# Patient Record
Sex: MALE | Race: WHITE
[De-identification: names, ages, dates, MRNs, and addresses within clinical notes are randomized per-mention and may not be internally consistent; named-entity substitution may affect disease eponyms.]

---

## 2018-03-06 ENCOUNTER — HOSPITAL ENCOUNTER (INPATIENT)
Dept: HOSPITAL 92 - ERS | Age: 68
LOS: 2 days | Discharge: HOME | DRG: 282 | End: 2018-03-08
Attending: INTERNAL MEDICINE | Admitting: INTERNAL MEDICINE
Payer: MEDICARE

## 2018-03-06 VITALS — BODY MASS INDEX: 28.5 KG/M2

## 2018-03-06 DIAGNOSIS — I25.10: ICD-10-CM

## 2018-03-06 DIAGNOSIS — J44.9: ICD-10-CM

## 2018-03-06 DIAGNOSIS — Z95.5: ICD-10-CM

## 2018-03-06 DIAGNOSIS — I22.9: ICD-10-CM

## 2018-03-06 DIAGNOSIS — R07.89: Primary | ICD-10-CM

## 2018-03-06 DIAGNOSIS — I10: ICD-10-CM

## 2018-03-06 DIAGNOSIS — E78.5: ICD-10-CM

## 2018-03-06 DIAGNOSIS — Z87.891: ICD-10-CM

## 2018-03-06 DIAGNOSIS — I48.91: ICD-10-CM

## 2018-03-06 LAB
ALBUMIN SERPL BCG-MCNC: 3.8 G/DL (ref 3.4–4.8)
ALP SERPL-CCNC: 89 U/L (ref 40–150)
ALT SERPL W P-5'-P-CCNC: 25 U/L (ref 8–55)
ANION GAP SERPL CALC-SCNC: 12 MMOL/L (ref 10–20)
APTT PPP: 34.6 SEC (ref 22.9–36.1)
AST SERPL-CCNC: 22 U/L (ref 5–34)
BASOPHILS # BLD AUTO: 0 THOU/UL (ref 0–0.2)
BASOPHILS NFR BLD AUTO: 0.3 % (ref 0–1)
BILIRUB SERPL-MCNC: 0.4 MG/DL (ref 0.2–1.2)
BUN SERPL-MCNC: 9 MG/DL (ref 8.4–25.7)
CALCIUM SERPL-MCNC: 8.7 MG/DL (ref 7.8–10.44)
CHLORIDE SERPL-SCNC: 106 MMOL/L (ref 98–107)
CK MB SERPL-MCNC: 1.8 NG/ML (ref 0–6.6)
CK SERPL-CCNC: 71 U/L (ref 30–200)
CO2 SERPL-SCNC: 26 MMOL/L (ref 23–31)
COMM CRITICAL RESULTS DOC: (no result)
CREAT CL PREDICTED SERPL C-G-VRATE: 0 ML/MIN (ref 70–130)
EOSINOPHIL # BLD AUTO: 0.5 THOU/UL (ref 0–0.7)
EOSINOPHIL NFR BLD AUTO: 3.3 % (ref 0–10)
GLOBULIN SER CALC-MCNC: 2.7 G/DL (ref 2.4–3.5)
GLUCOSE SERPL-MCNC: 135 MG/DL (ref 80–115)
HGB BLD-MCNC: 13.8 G/DL (ref 14–18)
INR PPP: 1.2
LIPASE SERPL-CCNC: 45 U/L (ref 8–78)
LYMPHOCYTES # BLD: 1.9 THOU/UL (ref 1.2–3.4)
LYMPHOCYTES NFR BLD AUTO: 14.1 % (ref 21–51)
MCH RBC QN AUTO: 32 PG (ref 27–31)
MCV RBC AUTO: 93.8 FL (ref 80–94)
MONOCYTES # BLD AUTO: 1.1 THOU/UL (ref 0.11–0.59)
MONOCYTES NFR BLD AUTO: 7.8 % (ref 0–10)
NEUTROPHILS # BLD AUTO: 10.1 THOU/UL (ref 1.4–6.5)
NEUTROPHILS NFR BLD AUTO: 74.4 % (ref 42–75)
PLATELET # BLD AUTO: 265 THOU/UL (ref 130–400)
POTASSIUM SERPL-SCNC: 3.9 MMOL/L (ref 3.5–5.1)
PROTHROMBIN TIME: 15.6 SEC (ref 12–14.7)
RBC # BLD AUTO: 4.32 MILL/UL (ref 4.7–6.1)
SODIUM SERPL-SCNC: 140 MMOL/L (ref 136–145)
TROPONIN I SERPL DL<=0.01 NG/ML-MCNC: 3.63 NG/ML (ref ?–0.03)
TROPONIN I SERPL DL<=0.01 NG/ML-MCNC: 4.77 NG/ML (ref ?–0.03)
TROPONIN I SERPL DL<=0.01 NG/ML-MCNC: 6.14 NG/ML (ref ?–0.03)
WBC # BLD AUTO: 13.5 THOU/UL (ref 4.8–10.8)

## 2018-03-06 PROCEDURE — 96375 TX/PRO/DX INJ NEW DRUG ADDON: CPT

## 2018-03-06 PROCEDURE — 96366 THER/PROPH/DIAG IV INF ADDON: CPT

## 2018-03-06 PROCEDURE — 94760 N-INVAS EAR/PLS OXIMETRY 1: CPT

## 2018-03-06 PROCEDURE — 85730 THROMBOPLASTIN TIME PARTIAL: CPT

## 2018-03-06 PROCEDURE — 71275 CT ANGIOGRAPHY CHEST: CPT

## 2018-03-06 PROCEDURE — 85610 PROTHROMBIN TIME: CPT

## 2018-03-06 PROCEDURE — 84484 ASSAY OF TROPONIN QUANT: CPT

## 2018-03-06 PROCEDURE — A4216 STERILE WATER/SALINE, 10 ML: HCPCS

## 2018-03-06 PROCEDURE — 83880 ASSAY OF NATRIURETIC PEPTIDE: CPT

## 2018-03-06 PROCEDURE — 71045 X-RAY EXAM CHEST 1 VIEW: CPT

## 2018-03-06 PROCEDURE — 83690 ASSAY OF LIPASE: CPT

## 2018-03-06 PROCEDURE — 96367 TX/PROPH/DG ADDL SEQ IV INF: CPT

## 2018-03-06 PROCEDURE — 94640 AIRWAY INHALATION TREATMENT: CPT

## 2018-03-06 PROCEDURE — 93306 TTE W/DOPPLER COMPLETE: CPT

## 2018-03-06 PROCEDURE — 36415 COLL VENOUS BLD VENIPUNCTURE: CPT

## 2018-03-06 PROCEDURE — 80048 BASIC METABOLIC PNL TOTAL CA: CPT

## 2018-03-06 PROCEDURE — 83605 ASSAY OF LACTIC ACID: CPT

## 2018-03-06 PROCEDURE — 87040 BLOOD CULTURE FOR BACTERIA: CPT

## 2018-03-06 PROCEDURE — 85025 COMPLETE CBC W/AUTO DIFF WBC: CPT

## 2018-03-06 PROCEDURE — 93005 ELECTROCARDIOGRAM TRACING: CPT

## 2018-03-06 PROCEDURE — 96365 THER/PROPH/DIAG IV INF INIT: CPT

## 2018-03-06 PROCEDURE — 80053 COMPREHEN METABOLIC PANEL: CPT

## 2018-03-06 PROCEDURE — 82553 CREATINE MB FRACTION: CPT

## 2018-03-06 PROCEDURE — 93798 PHYS/QHP OP CAR RHAB W/ECG: CPT

## 2018-03-06 RX ADMIN — Medication SCH ML: at 09:00

## 2018-03-06 RX ADMIN — ASPIRIN SCH MG: 81 TABLET ORAL at 08:59

## 2018-03-06 RX ADMIN — Medication SCH ML: at 21:29

## 2018-03-06 NOTE — HP
PRIMARY CARE PROVIDER:  None.

 

CHIEF COMPLAINT:  Chest pain.

 

HISTORY OF PRESENT ILLNESS:  Mr. Strange is a pleasant 67-year-old gentleman who was seen at [facility] on 2018.

 

One week ago, he had what appears to be an ST elevation myocardial infarction.  He underwent emergent
 PCI at [facility] in [place], [place].  He had Xience Alpine 3 x 18 mm stent placed in the
 distal RCA and proximal LAD.  He was discharged home after 3 days.

 

One of his favorite aunt  and he therefore came to this area to attend her .  He attended 
the wake today.  He reports that he developed retrosternal chest discomfort while he was sitting, it 
was a sensation of heaviness, 8-9/10, nonradiating, accompanied by shortness of breath, nausea, and l
ightheadedness.  He denies any fevers or chills.  By the time I saw him, the pain had resolved.

 

REVIEW OF SYSTEMS:  The following complete review of systems was negative, unless otherwise mentioned
 in the HPI or below:  Constitutional:  Weight loss or gain, ability to conduct usual activities.  Sk
in:  Rash, itching.  Eyes:  Double vision, pain.  ENT/Mouth:  Nose bleeding, neck stiffness, pain, te
nderness.  Cardiovascular:  Palpitations, dyspnea on exertion, orthopnea.  Respiratory:  Shortness of
 breath, wheezing, cough, hemoptysis, fever or night sweats.  Gastrointestinal:  Poor appetite, abdom
inal pain, heartburn, nausea, vomiting, constipation, or diarrhea.  Genitourinary:  Urgency, frequenc
y, dysuria, nocturia.  Musculoskeletal:  Pain, swelling.  Neurologic/Psychiatric:  Anxiety, depressio
n.  Allergy/Immunologic:  Skin rash, bleeding tendency.

 

PAST MEDICAL HISTORY:  Significant for coronary artery disease, myocardial infarction, atrial fibrill
ation, chronic obstructive pulmonary disease, dyslipidemia, and hypertension.

 

PAST SURGICAL HISTORY:  Appendectomy and PCI with stents.

 

FAMILY HISTORY:  Three siblings with cardiac disease.  His dad  from emphysema.

 

SOCIAL HISTORY:  The patient is a former smoker.  He denies alcohol use or recreational drug use.

 

ALLERGIES:  No known drug allergies.

 

CURRENT MEDICATIONS:  Include Eliquis 2.5 mg 2 times a day, losartan 25 mg daily, atorvastatin 80 mg 
daily, pantoprazole 40 mg daily, carvedilol 12.5 mg 2 times a day, Plavix 75 mg daily, and aspirin 81
 mg daily.

 

CODE STATUS:  I discussed his code status.  He is FULL CODE.

 

PHYSICAL EXAMINATION:

GENERAL:  Mr. Strange is awake and alert, not in acute distress.

VITAL SIGNS:  Blood pressure is 127/83, pulse is 103, his breathing at rate of 20 and saturating 94% 
on 2 liters of oxygen.  He is afebrile.

EYES:  No scleral icterus.  No pitting palpable.

ENT:  Moist mucosal membranes.  No oropharyngeal, erythema, or exudates.

NECK:  Supple, nontender, normal range of movement.  Trachea is midline.

RESPIRATORY:  Accessory muscles of breathing are not active.  Chest wall movements are symmetric bila
terally.  He has occasional expiratory wheeze.

CARDIOVASCULAR:  S1 and S2 are heard, irregular and tachycardic.  Peripheral pulses palpable.  No car
otid bruit, no pericardial rub.

ABDOMEN:  Soft, nontender, bowel sounds are heard.  No hepatomegaly, no splenomegaly.

NEUROLOGIC:  Cranial nerves II through XII intact.  Deep tendon reflexes are 2+.

SKIN:  No rashes or subcutaneous nodules.

MUSCULOSKELETAL:  Power is 5/5 in all 4 extremities.

LYMPHATIC:  No cervical lymphadenopathy.

PSYCHIATRIC:  Normal mood, normal affect.  Patient is oriented to person, place, and time.

 

LABORATORY DATA:  Mr. Strange' labs and investigations were reviewed.  I reviewed his electrocardiogr
am, which shows atrial fibrillation with rapid ventricular response, no ST changes to suggest an acut
e coronary syndrome.  I also reviewed his chest x-ray, which does not show any pulmonary infiltrates.
  He also had a CT angiogram of the chest.  The report is pending.  He has leukocytosis with 13,500 w
saleem cells, of which 74% are neutrophils, normocytic anemia with hemoglobin 13.8, normal platelet cou
nt, INR 1.2, unremarkable comprehensive metabolic profile, elevated BNP of 1163 and elevated troponin
 I of 6.142.

 

ASSESSMENT AND PLAN:  Mr. Strange is a pleasant 67-year-old gentleman who was seen at [facility] on 2018.  His problem list includes:

1.  Chest pain:  Etiology unclear at this time, but he has significant risk factors for cardiac cause
s, including recent MI and PCI with stents.  He will be admitted to the hospital for telemetry monito
ring and Cardiology Service will be consulted.

2.  Atrial fibrillation with rapid ventricular response:  We will start patient on a calcium channel 
blocker drip for rate control.

3.  Elevated troponin:  It is unclear whether his troponin is trending down from his recent myocardia
l infarction or if it is trending up.  We will recheck troponin level.  We will also request records 
from [facility] in [place], [place].

4.  Hypertension:  Monitor vital signs, titrate antihypertensives as needed.

5.  Dyslipidemia:  Continue statin.

6.  Continue proton-pump inhibitor, since patient is on aspirin, Plavix, and Eliquis at this point in
 time.

 

LEVEL OF RISK:  High.

 

LEVEL OF COMPLEXITY:  High.

## 2018-03-06 NOTE — CON
DATE OF CONSULTATION:  2018

 

HISTORY OF PRESENT ILLNESS:  Patient is a 67-year-old gentleman who presented 
with acute onset of recurrent chest discomfort.  The patient has no previous 
cardiac history until a week ago he presented with acute onset of myocardial 
infarction.  The patient underwent emergent cardiac catheterization.  He 
apparently underwent PTCA and stent placement into the right coronary artery 
and left anterior descending artery.  The patient also was diagnosed with 
atrial fibrillation.  The patient was discharged on aspirin, Plavix, and 
Eliquis.  The patient also was also placed on Coreg and losartan.  The patient 
has been compliant with his medications.  He was on his way to a .  He 
had travelled for a long distance and then developed mid sternal chest 
discomfort.  He did not have nitroglycerin and the patient subsequently 
received sublingual nitro with resolution of his chest pain.  The patient 
denies having any present chest discomfort.

 

PAST MEDICAL HISTORY:  

1.  Coronary artery disease.

2.  Atrial fibrillation.

3.  Chronic obstructive pulmonary disease.

4.  Rheumatoid arthritis.

 

PAST SURGICAL HISTORY:  Appendectomy.

 

SOCIAL HISTORY:  He is a former smoker.

 

ALLERGIES:  He has no known drug allergies.

 

FAMILY HISTORY:  There is no strong family history of heart disease.

 

REVIEW OF SYSTEMS:  Ten-point system is unremarkable.  No history of easy 
bruising, bleeding, bright red blood per rectum.

 

PHYSICAL EXAMINATION:

GENERAL:  This is an elderly gentleman in no acute distress.

VITAL SIGNS:  Blood pressure 134/68, heart rate is 107 and irregular.

NECK:  Showed no jugular venous distention.

LUNGS:  Have bilateral wheezes.

HEART:  Regular rate and rhythm, normal S1 and S2, 1/6 systolic murmur.

ABDOMEN:  Nondistended.

EXTREMITIES:  Showed no edema.

SKIN:  Warm and dry.

NEUROLOGIC:  Nonfocal.

VASCULAR:  Radial pulses are 2+.

 

LABORATORY DATA AND IMAGING DATA:  Sodium was 140, potassium 3.9, chloride 106, 
bicarbonate 26, BUN 9, creatinine 0.94.  Troponin was 3.62.  His BNP is 1163.  
His EKG revealed him to have atrial fibrillation with Q-waves suggestive of 
previous infarct and T-wave abnormality suggestive of ischemia.

 

IMPRESSION:

1.  Chest pain.

2.  History of recent myocardial infarction.

3.  New onset of atrial fibrillation.

4.  Hypertension.

5.  Chronic obstructive pulmonary disease.

 

DISCUSSION AND PLAN:  This gentleman presents with recurrent chest pain.  His 
CPK MB is not elevated, and his troponin is declining suggestive of an old MI.  
The patient does not appear  to have  congestive heart failure.  From a cardiac 
standpoint, I discussed the option of undergoing repeat catheterization versus 
medical therapy.  The patient at this time would prefer to be placed on medical 
therapy.  We will obtain an echocardiogram to reevaluate his left ventricular 
function.  We will treat the patient with Lovenox.  We will follow this patient 
with you through his hospitalization.

 

FLORA

## 2018-03-06 NOTE — RAD
PORTABLE CHEST:

 

History: Chest pain. 

 

Comparison: None. 

 

FINDINGS/IMPRESSION:  

Streaky atelectasis and/or infiltrate in the lung bases is seen. This is better appreciated on CT carmencita
st, also performed 3-6-18. 

 

There are chronic lung changes with bullous change which are also better appreciated on CT. 

 

 

 

POS: [facility]

## 2018-03-06 NOTE — CT
PRELIMINARY REPORT/[facility]/EMERGENCY AFTER

HOURS PROCEDURE:

 

EXAM:

CT Angiography Chest With Intravenous Contrast

 

CLINICAL HISTORY:

67 years old, male; Chest pain; On breathing; substernal chest pain onset 1800 tonight, pain resolved
 now. Denies radiation of pain. Pain felt dull and tight. States that he also felt dizzy and SOB when
 he was experiencing chest pain earlier this evening. Reports nausea, denies vomiting. Pt states he i
s here from out [facility] for aunt's . Reports feeling upset and anxious due to this recently. O
n tuesday last week, pt was admitted for mi at [facility] and had x2 stents placed, one on 
r which was 100% blocked and one on l side which was 90% blocked. Family reports on back side of pt's
 heart pt had 50% blockage which doctor thought did not need a stent at that time. Pt was d/c with as
pirin 81 mg and plavix. States that he has been taking this medication as prescribed and has not miss
ed any doses. Pt was h/o smoking for 40 years, 2 packs/day, and quit about 6 years ago. Pt still chew
s tobacco. Wife states pt also takes eliquis. Denies h/o dm. Reports h/o high cholesterol and HTN.

 

TECHNIQUE:

Axial computed tomographic angiography images of the chest with intravenous contrast using pulmonary 
embolism protocol. Coronal and bilateral oblique CTA MIP reformats.

 

CONTRAST:

100 mL of isovue 370 administered intravenously.

 

COMPARISON:

None.

 

FINDINGS:

Pulmonary arteries: No pulmonary embolism.

Aorta:

No evidence of thoracic aortic dissection/aneurysm.

Mild aortic arch calcifications.

Lungs:

Lower lungs show moderate, subsegmental atelectasis and/or scar.

Marked centrilobular emphysema of both upper lobes.

Pleural space:

Small bilateral pleural effusions.

No pneumothorax.

Heart: Mild cardiomegaly.

No significant pericardial effusion. No evidence of RV dysfunction.

Bones/joints: Mild, thoracic spine degenerative disease.

Soft tissues: Moderate, bilateral, breast retroareolar, soft tissue most likely represents gynecomast
ia.

Lymph nodes: Mildly enlarged right pulmonary hilar lymph node measuring 2.7 x 2.0 cm (axial series 2,
 image 57).

Stomach and bowel:

Stomach difficult to accurately comment upon due to its minimal distention.

There may be true gastric rugal fold thickening.

This may represent inflammatory or infectious gastritis.

 

IMPRESSION:

1. No CT evidence of pulmonary embolism.

2. Lower lungs show moderate, subsegmental atelectasis and/or scar.

3. Marked centrilobular emphysema

4. Small bilateral pleural effusions.

5. Mild cardiomegaly.

6. Moderate, bilateral, breast retroareolar, soft tissue most likely represents gynecomastia. Physica
l exam and if clinically indicated nonemergent breast imaging (US and diagnostic mammography) may

be performed.

7. Mildly enlarged right pulmonary hilar lymph node.

Causes include inflammation, infection, or neoplasm.

8. Stomach difficult to accurately comment upon due to its minimal distention. However, there may be 
true diffuse gastric rugal fold thickening.

This may represent inflammatory or infectious gastritis.

 

If clinically indicated, nonemergent upper GI fluoroscopic series may provide more information.

 

Thank you for allowing us to participate in the care of your patient.

 

Dictated and Authenticated by: Marlo Booth MD

2018 3:14 AM Central Time ([place] & [place])

 

 

 

 

FINAL REPORT

CT PULMONARY ANGIOGRAM WITH IV CONTRAST AND 3D POST PROCESSING:

 

I agree with the preliminary report given by Dr. Marlo Booth of [facility]. 

 

POS: [facility]

## 2018-03-07 LAB
ANION GAP SERPL CALC-SCNC: 11 MMOL/L (ref 10–20)
BUN SERPL-MCNC: 12 MG/DL (ref 8.4–25.7)
CALCIUM SERPL-MCNC: 8.8 MG/DL (ref 7.8–10.44)
CHLORIDE SERPL-SCNC: 106 MMOL/L (ref 98–107)
CO2 SERPL-SCNC: 25 MMOL/L (ref 23–31)
CREAT CL PREDICTED SERPL C-G-VRATE: 103 ML/MIN (ref 70–130)
GLUCOSE SERPL-MCNC: 140 MG/DL (ref 80–115)
HGB BLD-MCNC: 12.8 G/DL (ref 14–18)
MCH RBC QN AUTO: 31.2 PG (ref 27–31)
MCV RBC AUTO: 94.5 FL (ref 80–94)
MDIFF COMPLETE?: YES
PLATELET # BLD AUTO: 284 THOU/UL (ref 130–400)
POTASSIUM SERPL-SCNC: 4.1 MMOL/L (ref 3.5–5.1)
RBC # BLD AUTO: 4.1 MILL/UL (ref 4.7–6.1)
SODIUM SERPL-SCNC: 138 MMOL/L (ref 136–145)
WBC # BLD AUTO: 22.5 THOU/UL (ref 4.8–10.8)

## 2018-03-07 RX ADMIN — Medication SCH ML: at 08:04

## 2018-03-07 RX ADMIN — ASPIRIN SCH MG: 81 TABLET ORAL at 08:03

## 2018-03-07 NOTE — PDOC.PN
- Subjective


Encounter Start Date: 03/07/18


Encounter Start Time: 19:45


Subjective: f/u for CP and recent acute MI s/p PTCA and stent placement. ? ACS


-: recurrence but medically managed currently. A-fib RVR initially now


-: rate controlled on Eliquis for anticoagulation. No new complaints.





- Objective


Resuscitation Status: 


 











Resuscitation Status           FULL:Full Resuscitation














MAR Reviewed: Yes


Vital Signs & Weight: 


 Vital Signs (12 hours)











  Temp Pulse Pulse Pulse Resp BP BP


 


 03/07/18 15:25  97.6 F  86    16  


 


 03/07/18 11:28  97.7 F  65    18  


 


 03/07/18 10:15    70  80   99/68  128/64














  BP Pulse Ox Pulse Ox Pulse Ox


 


 03/07/18 15:25  141/75 H  92 L  


 


 03/07/18 11:28  127/77  94 L  


 


 03/07/18 10:15    94 L  90 L








 Weight











Weight                         176 lb 8 oz














I&O: 


 











 03/06/18 03/07/18 03/08/18





 06:59 06:59 06:59


 


Intake Total  790 1200


 


Output Total  740 740


 


Balance  50 460











Result Diagrams: 


 03/07/18 05:14





 03/07/18 05:14


Additional Labs: 





Microbiology





03/06/18 01:37   Venous blood - Left Arm   Blood Culture - Preliminary


                              Specimen has been received and culture in 

progress.


                              No Growth to date.


03/06/18 01:33   Venous blood - Left Hand   Blood Culture - Preliminary


                              Specimen has been received and culture in 

progress.


                              No Growth to date.





 Laboratory Tests











  03/06/18 03/06/18 03/06/18





  01:05 01:05 01:05


 


WBC  13.5 H  


 


Hgb  13.8 L  


 


Troponin I   6.142 H* 


 


B-Natriuretic Peptide    1163.6 H














  03/06/18 03/06/18





  04:20 07:08


 


WBC  


 


Hgb  


 


Troponin I  4.773 H*  3.627 H*


 


B-Natriuretic Peptide  











Radiology Reviewed by me: Yes (CT chest - mod-severe emphysematous changes, no 

PE)


EKG Reviewed by me: Yes (Tele - A-fib in 80's)





Phys Exam





- Physical Examination


Constitutional: NAD


HEENT: PERRLA, oral pharynx no lesions


Neck: no JVD, supple


diminished breath sounds bilat, occ wheeze


Cardiovascular: irregular


Gastrointestinal: soft, non-tender, no distention, positive bowel sounds


Musculoskeletal: no edema, pulses present


Neurological: normal sensation, moves all 4 limbs


Psychiatric: A&O x 3


Skin: normal turgor, cap refill <2 seconds





Dx/Plan


(1) Atrial fibrillation with RVR


Code(s): I48.91 - UNSPECIFIED ATRIAL FIBRILLATION   Status: Acute   Comment: 

rate-controlled currently, continue Coreg, Digoxin and Eliquis   





(2) Elevated troponin I level


Code(s): R74.8 - ABNORMAL LEVELS OF OTHER SERUM ENZYMES   Status: Acute   

Comment: ? new event or a result of recent MI with stent placement, pt 

requesing medical mgmt and not [facility]   





(3) Myocardial infarction


Code(s): I21.9 - ACUTE MYOCARDIAL INFARCTION, UNSPECIFIED   Status: Acute   

Comment: s/p stent placement after PTCA, continue ASA and Plavix, Lipitor, 

Coreg   





(4) Ischemic cardiomyopathy


Code(s): I25.5 - ISCHEMIC CARDIOMYOPATHY   Status: Acute   Comment: EF 35-40%, 

serial monitoring post revascularization, ACE-i on d/c   





(5) Tobacco abuse


Code(s): Z72.0 - TOBACCO USE   Status: Acute   





(6) Leukocytosis


Code(s): D72.829 - ELEVATED WHITE BLOOD CELL COUNT, UNSPECIFIED   Status: Acute

   Comment: ? etiology, ? demargination, no focal infection noted and no fever, 

repeat CBC in am   





- Plan


plan discussed w/ family, out of bed/ambulate, DVT proph w/SCDs


Stable currently


-: Continue ASA and Plavix


-: Continue Lipitor


-: continue Digoxin and Coreg


-: Continue Eliquis 2.5mg BID





* Tobacco cessation


* Likely home in am 3/8/18

## 2018-03-08 VITALS — SYSTOLIC BLOOD PRESSURE: 161 MMHG | DIASTOLIC BLOOD PRESSURE: 101 MMHG

## 2018-03-08 VITALS — TEMPERATURE: 98 F

## 2018-03-08 LAB
BASOPHILS # BLD AUTO: 0 THOU/UL (ref 0–0.2)
BASOPHILS NFR BLD AUTO: 0.1 % (ref 0–1)
EOSINOPHIL # BLD AUTO: 0.1 THOU/UL (ref 0–0.7)
EOSINOPHIL NFR BLD AUTO: 0.9 % (ref 0–10)
HGB BLD-MCNC: 13 G/DL (ref 14–18)
LYMPHOCYTES # BLD: 1.9 THOU/UL (ref 1.2–3.4)
LYMPHOCYTES NFR BLD AUTO: 12.1 % (ref 21–51)
MCH RBC QN AUTO: 31.2 PG (ref 27–31)
MCV RBC AUTO: 94.8 FL (ref 80–94)
MONOCYTES # BLD AUTO: 1.4 THOU/UL (ref 0.11–0.59)
MONOCYTES NFR BLD AUTO: 8.7 % (ref 0–10)
NEUTROPHILS # BLD AUTO: 12.5 THOU/UL (ref 1.4–6.5)
NEUTROPHILS NFR BLD AUTO: 78.2 % (ref 42–75)
PLATELET # BLD AUTO: 292 THOU/UL (ref 130–400)
RBC # BLD AUTO: 4.17 MILL/UL (ref 4.7–6.1)
WBC # BLD AUTO: 16 THOU/UL (ref 4.8–10.8)

## 2018-03-08 RX ADMIN — ASPIRIN SCH MG: 81 TABLET ORAL at 08:26

## 2018-03-08 RX ADMIN — Medication SCH: at 07:31

## 2018-03-08 RX ADMIN — Medication SCH ML: at 08:26

## 2018-03-08 NOTE — DIS
DATE OF ADMISSION:  2018 

 

DATE OF DISCHARGE:  2018 

 

DISCHARGE DISPOSITION:  To home.

 

PRIMARY DISCHARGE DIAGNOSES:

1.  Chest pain with recent acute myocardial infarction.

2.  Coronary artery disease with recent stenting done to left anterior 
descending and right coronary artery at [facility].

3.  Ischemic cardiomyopathy with ejection fraction of 35%-40%.

4.  Chronic obstructive pulmonary disease with centrilobular emphysema on CAT 
scan.

5.  Paroxysmal atrial fibrillation.

6.  Dyslipidemia.

 

PROCEDURES DONE DURING HOSPITALIZATION:  The patient has had CT angio of chest 
done, which showed no evidence of PE, but the patient had findings of marked 
centrilobular emphysema.  Echo with 2D Doppler done showed an EF of 35%-40%, 
inferior wall akinesis was seen, moderate-to-severe mitral regurgitation was 
present.  Blood cultures x2 no growth.  Discharge white count of 16, H&H 13 and 
39, platelet count 292 with 78% neutrophils, MCV is 94.  INR is 1.2.  Initial 
troponin I was 6.14, which is trending down to 3.6.  BNP was 1163.  BUN and 
creatinine 12 and 0.8.

 

DISCHARGE MEDICATIONS:  Eliquis 2.5 mg p.o. twice daily, albuterol inhaler q.6 
hourly p.r.n., aspirin 81 mg p.o. daily, Plavix 75 mg p.o. daily, Coreg 3.125 
mg p.o. twice daily, Symbicort inhaler 160/4.5 mcg 2 puffs twice daily, 
atorvastatin 80 mg p.o. at bedtime, digoxin 0.25 mg p.o. daily, Cozaar 50 mg 
p.o. daily, Protonix 40 mg p.o. daily.

 

ALLERGIES:  No known drug allergies.

 

INPATIENT CONSULTS:  Dr. Craft for Cardiology.

 

DISCHARGE PLAN:  The patient to follow up with his cardiologist in [facility] in 2 weeks.  He also needs to follow up with his primary care physician in 
1 week.

 

BRIEF COURSE DURING HOSPITALIZATION:  The patient initially got admitted on the 
 with complaints of chest pain.  He is originally from [facility] and 
had been here to attend a .  He also mentioned that he was recently 
hospitalized for acute MI and had 2 stents placed, one to proximal LAD and 
another to distal RCA, and was discharged home 3 days prior to arrival here.  
In view of this history, the patient was admitted to telemetry.  His initial 
troponin was 6.  He has had consultation with Dr. Craft for Cardiology.  The 
patient had expressed not to have another angiogram and he would like to follow 
up with his cardiologist after discharge.  In view of this, he has had serial 
troponin done, which has been trending down.  He is chest pain free.  A CT 
angio chest done showed findings suggestive of emphysema.  His EF was around 35%
-40% on the echo.  The patient has akinesis on the inferior wall.  Overall, the 
patient has remained hemodynamically stable.  This morning, he is wanting to go 
home.  He has been advised to check blood pressure and pulse daily and record 
on a sheet of paper to follow up with his primary care physician.  The patient 
also received Lasix during his brief stay here.  He has been given 
prescriptions for albuterol inhaler and Symbicort inhaler.  The patient was 
also evaluated by Dr. Hernandes, pulmonologist, at his recent hospitalization in 
[facility].  He has been strongly advised to follow up 
with Dr. Hernandes, pulmonologist, in 2 weeks.  His medications were optimized 
with his current EF.  He has remained hemodynamically stable and will be 
shortly discharged home. Please see face to face documentation on [facility] for 
the day of discharge.

 

FLORA

## 2018-03-08 NOTE — PDOC.PN
- Subjective


Encounter Start Date: 03/08/18


Encounter Start Time: 08:40


Subjective: feels good, no chest pain 


-: has exertional sob





- Objective


Resuscitation Status: 


 











Resuscitation Status           FULL:Full Resuscitation














MAR Reviewed: Yes


Vital Signs & Weight: 


 Vital Signs (12 hours)











  Temp Pulse Pulse Pulse Resp BP BP


 


 03/08/18 08:31    105 H  110 H   149/111 H  154/91 H


 


 03/08/18 08:23   84     


 


 03/08/18 08:00  98 F  84    18  


 


 03/08/18 07:51  98 F  84    18  


 


 03/08/18 04:00  99 F  99    21 H  


 


 03/08/18 00:00  98.2 F  87    16  














  BP Pulse Ox Pulse Ox Pulse Ox


 


 03/08/18 08:31    91 L  91 L


 


 03/08/18 08:23    


 


 03/08/18 08:00   94 L  


 


 03/08/18 07:51  135/93 H  94 L  


 


 03/08/18 04:00  131/93 H  93 L  


 


 03/08/18 00:00  124/81  93 L  








 Weight











Weight                         176 lb 8 oz














I&O: 


 











 03/07/18 03/08/18 03/09/18





 06:59 06:59 06:59


 


Intake Total 790 1200 


 


Output Total 740 740 


 


Balance 50 460 











Result Diagrams: 


 03/08/18 05:23





 03/07/18 05:14





Phys Exam





- Physical Examination


HEENT: PERRLA, sclera anicteric


Neck: no JVD, supple


Respiratory: no wheezing, no rales


rhonchi+


Cardiovascular: RRR, no significant murmur


Gastrointestinal: soft, non-tender, positive bowel sounds


Musculoskeletal: no edema, pulses present


Neurological: non-focal, moves all 4 limbs


Psychiatric: A&O x 3





Dx/Plan


(1) COPD (chronic obstructive pulmonary disease)


Status: Chronic   


Qualifiers: 


   COPD type: emphysema   Emphysema type: centrilobular   Qualified Code(s): 

J43.2 - Centrilobular emphysema   





(2) Afib


Code(s): I48.91 - UNSPECIFIED ATRIAL FIBRILLATION   Status: Chronic   


Qualifiers: 


   Atrial fibrillation type: paroxysmal   Qualified Code(s): I48.0 - Paroxysmal 

atrial fibrillation   





(3) CAD (coronary artery disease)


Code(s): I25.10 - ATHSCL HEART DISEASE OF NATIVE CORONARY ARTERY W/O ANG PCTRS 

  Status: Acute   


Qualifiers: 


   Coronary Disease-Associated Artery/Lesion type: native artery   Native vs. 

transplanted heart: native heart   Associated angina: without angina   

Qualified Code(s): I25.10 - Atherosclerotic heart disease of native coronary 

artery without angina pectoris   


Comment: recent stent to LAD and rca at [facility]   





(4) H/O rheumatoid arthritis


Code(s): Z87.39 - PERSONAL HISTORY OF DISEASES OF THE MS SYS AND CONN TISS   

Status: Chronic   





(5) Dyslipidemia


Code(s): E78.5 - HYPERLIPIDEMIA, UNSPECIFIED   Status: Chronic   





(6) Ischemic cardiomyopathy


Code(s): I25.5 - ISCHEMIC CARDIOMYOPATHY   Status: Chronic   Comment: EF 35-40%

, serial monitoring post revascularization, ACE-i on d/c   





- Plan


wbc down to 16k, no obvious sources, likely due to recent MI/margination


-: alb inh, meds are being optimized for current EF


-: Counselled to f/u with his cardio in 2 weeks and his pulm in 2 weeks


-: He wants to go home, is from [facility] and will f/u with his PCP


-: on asp low dose, plavix and eliquis low dose





* .








Review of Systems





- Medications/Allergies


Allergies/Adverse Reactions: 


 Allergies











Allergy/AdvReac Type Severity Reaction Status Date / Time


 


No Known Drug Allergies Allergy   Verified 03/06/18 01:27











Medications: 


 Current Medications





Acetaminophen (Tylenol)  650 mg PO Q4H PRN


   PRN Reason: Headache/Fever or Pain


Acetaminophen (Tylenol)  650 mg [place] Q4H PRN


   PRN Reason: Headache/Fever or Pain


Apixaban (Eliquis)  2.5 mg PO BID [facility]


   Last Admin: 03/08/18 08:23 Dose:  2.5 mg


Aspirin (Ecotrin)  81 mg PO DAILY [facility]


   Last Admin: 03/08/18 08:26 Dose:  81 mg


Atorvastatin Calcium (Lipitor)  80 mg PO HS [facility]


   Last Admin: 03/07/18 20:37 Dose:  80 mg


Bisacodyl (Dulcolax)  10 mg PO DAILYPRN PRN


   PRN Reason: Constipation


Carvedilol (Coreg)  3.125 mg PO BID-WM [facility]


   Last Admin: 03/08/18 08:25 Dose:  3.125 mg


Clopidogrel Bisulfate (Plavix)  75 mg PO DAILY [facility]


   Last Admin: 03/08/18 08:25 Dose:  75 mg


Digoxin (Lanoxin)  0.25 mg PO DAILY [facility]


   Last Admin: 03/08/18 08:23 Dose:  0.25 mg


Losartan Potassium (Cozaar)  50 mg PO DAILY [facility]


   Last Admin: 03/08/18 08:57 Dose:  50 mg


Morphine Sulfate (Morphine)  2 mg SLOW IVP Q5M PRN


   PRN Reason: Chest Pain


Nitroglycerin (Nitrostat)  0.4 mg SL Q5MIN PRN


   PRN Reason: Chest Pain


Sodium Chloride (Flush - Normal Saline)  10 ml IVF Q12HR [facility]


   Last Admin: 03/08/18 08:26 Dose:  10 ml


Sodium Chloride (Flush - Normal Saline)  10 ml IVF PRN PRN


   PRN Reason: Saline Flush

## 2018-03-17 NOTE — EKG
Test Reason : 

Blood Pressure : ***/*** mmHG

Vent. Rate : 112 BPM     Atrial Rate : 141 BPM

   P-R Int : 000 ms          QRS Dur : 100 ms

    QT Int : 320 ms       P-R-T Axes : 000 015 -68 degrees

   QTc Int : 436 ms

 

Atrial fibrillation with rapid ventricular response



Abnormal ECG

 

Confirmed by MONI WILSON, ODILIA DUNN ([id number]),  DMITRI MARTIN ([id number]) on 3/17/2018 11:44:20 AM

 

Referred By:             Confirmed By:ODILIA MONTENEGRO MD